# Patient Record
Sex: MALE | Race: WHITE | Employment: FULL TIME | URBAN - NONMETROPOLITAN AREA
[De-identification: names, ages, dates, MRNs, and addresses within clinical notes are randomized per-mention and may not be internally consistent; named-entity substitution may affect disease eponyms.]

---

## 2020-12-01 ENCOUNTER — APPOINTMENT (OUTPATIENT)
Dept: GENERAL RADIOLOGY | Age: 25
End: 2020-12-01
Payer: COMMERCIAL

## 2020-12-01 ENCOUNTER — HOSPITAL ENCOUNTER (EMERGENCY)
Age: 25
Discharge: HOME | End: 2020-12-01
Payer: COMMERCIAL

## 2020-12-01 VITALS
OXYGEN SATURATION: 98 % | SYSTOLIC BLOOD PRESSURE: 130 MMHG | WEIGHT: 245 LBS | RESPIRATION RATE: 16 BRPM | HEIGHT: 72 IN | BODY MASS INDEX: 33.18 KG/M2 | HEART RATE: 83 BPM | DIASTOLIC BLOOD PRESSURE: 87 MMHG | TEMPERATURE: 98.4 F

## 2020-12-01 PROCEDURE — 99203 OFFICE O/P NEW LOW 30 MIN: CPT | Performed by: NURSE PRACTITIONER

## 2020-12-01 PROCEDURE — 73130 X-RAY EXAM OF HAND: CPT

## 2020-12-01 PROCEDURE — 99202 OFFICE O/P NEW SF 15 MIN: CPT

## 2020-12-01 PROCEDURE — 29125 APPL SHORT ARM SPLINT STATIC: CPT

## 2020-12-01 RX ORDER — CLINDAMYCIN HYDROCHLORIDE 150 MG/1
150 CAPSULE ORAL 3 TIMES DAILY
COMMUNITY

## 2020-12-01 RX ORDER — IBUPROFEN 800 MG/1
800 TABLET ORAL EVERY 6 HOURS PRN
COMMUNITY

## 2020-12-01 RX ORDER — METHYLPREDNISOLONE 4 MG/1
2 TABLET ORAL DAILY
COMMUNITY

## 2020-12-01 RX ORDER — HYDROCODONE BITARTRATE AND ACETAMINOPHEN 5; 325 MG/1; MG/1
1 TABLET ORAL EVERY 6 HOURS PRN
Qty: 8 TABLET | Refills: 0 | Status: SHIPPED | OUTPATIENT
Start: 2020-12-01 | End: 2020-12-03

## 2020-12-01 ASSESSMENT — PAIN DESCRIPTION - LOCATION: LOCATION: HAND

## 2020-12-01 ASSESSMENT — ENCOUNTER SYMPTOMS: COLOR CHANGE: 1

## 2020-12-01 ASSESSMENT — PAIN DESCRIPTION - DESCRIPTORS: DESCRIPTORS: ACHING

## 2020-12-01 ASSESSMENT — PAIN DESCRIPTION - FREQUENCY: FREQUENCY: CONTINUOUS

## 2020-12-01 ASSESSMENT — PAIN DESCRIPTION - PAIN TYPE: TYPE: ACUTE PAIN

## 2020-12-01 ASSESSMENT — PAIN SCALES - GENERAL: PAINLEVEL_OUTOF10: 7

## 2020-12-01 ASSESSMENT — PAIN DESCRIPTION - ORIENTATION: ORIENTATION: RIGHT

## 2020-12-01 NOTE — ED TRIAGE NOTES
Pt ambulatory into [facility] with c/o right hand pain and swelling. pt states he was trying to hit a pig but swung and hit a metal gate this am at 0600. Pt states pain 7.

## 2020-12-01 NOTE — ED PROVIDER NOTES
[address] [facility] Encounter      CHIEFCOMPLAINT       Chief Complaint   Patient presents with    Hand Injury     right hand       Nurses Notes reviewed and I agree except as noted in the HPI. HISTORY OF PRESENT ILLNESS   Sony Ramos is a 22 y.o. male who presents to the urgent care for evaluation of right hand injury. He went to swing at a pig around 6 am and missed and his right hand hit the gate. He currently taking ibuprofen and on clindamycin for his wisdom tooth. He has not tried any ice or other treatments. No previous injury to right hand. There is pain, swelling, bruising to right dorsal hand below fifth digit. Denies any numbness, tingling, weakness. REVIEW OF SYSTEMS     Review of Systems   Constitutional: Negative for activity change, appetite change, chills, diaphoresis, fatigue and fever. Musculoskeletal: Positive for arthralgias and joint swelling. Skin: Positive for color change. Negative for pallor, rash and wound. Neurological: Negative for weakness and numbness. Hematological: Does not bruise/bleed easily. PAST MEDICAL HISTORY   History reviewed. No pertinent past medical history. SURGICAL HISTORY     Patient  has a past surgical history that includes [place] tooth extraction; back surgery; and Abdomen surgery. CURRENT MEDICATIONS       Previous Medications    CLINDAMYCIN (CLEOCIN) 150 MG CAPSULE    Take 150 mg by mouth 3 times daily    IBUPROFEN (ADVIL;MOTRIN) 800 MG TABLET    Take 800 mg by mouth every 6 hours as needed for Pain    METHYLPREDNISOLONE (MEDROL) 4 MG TABLET    Take 2 mg by mouth daily       ALLERGIES     Patient is has No Known Allergies. FAMILY HISTORY     Patient'sfamily history is not on file. SOCIAL HISTORY     Patient  reports that he has never smoked. He has never used smokeless tobacco. He reports previous alcohol use. He reports that he does not use drugs.     PHYSICAL EXAM     ED TRIAGE VITALS  BP: 130/87, Temp: 98.4 °F (36.9 °C), Pulse: 83, Resp: 16, SpO2: 98 %  Physical Exam  Vitals signs and nursing note reviewed. Constitutional:       General: He is not in acute distress. Appearance: Normal appearance. He is well-developed and well-groomed. He is obese. He is not ill-appearing, toxic-appearing or diaphoretic. HENT:      Head: Normocephalic and atraumatic. Cardiovascular:      Rate and Rhythm: Normal rate and regular rhythm. Pulses: Normal pulses. Radial pulses are 2+ on the right side. Pulmonary:      Effort: Pulmonary effort is normal.      Breath sounds: Normal breath sounds and air entry. No stridor, decreased air movement or transmitted upper airway sounds. Musculoskeletal:         General: Swelling, tenderness, deformity and signs of injury present. Right wrist: Normal.      Right hand: He exhibits decreased range of motion, tenderness, bony tenderness, deformity and swelling. He exhibits normal capillary refill and no laceration. Normal sensation noted. Decreased sensation is not present in the ulnar distribution, is not present in the medial distribution and is not present in the radial distribution. Decreased strength noted. Hands:    Skin:     General: Skin is warm and dry. Capillary Refill: Capillary refill takes less than 2 seconds. Coloration: Skin is not pale. Findings: Bruising present. No erythema, lesion or rash. Neurological:      Mental Status: He is alert. Psychiatric:         Behavior: Behavior is cooperative. DIAGNOSTIC RESULTS   Labs:  Abnormal Labs Reviewed - No data to display     IMAGING:  XR HAND RIGHT (MIN 3 VIEWS)   Final Result   1. There is an angulated mildly comminuted fracture through the midshaft of the right fifth metacarpal bone with overlying soft tissue swelling. This is compatible with a boxer's fracture. **This report has been created using voice recognition software.   It may contain minor errors which are inherent in voice recognition technology. **      Final report electronically signed by Dr. Clive Gonzalez on 12/1/2020 4:35 PM        URGENT CARE COURSE:     Vitals:    12/01/20 1613   BP: 130/87   Pulse: 83   Resp: 16   Temp: 98.4 °F (36.9 °C)   TempSrc: Temporal   SpO2: 98%   Weight: 245 lb (111.1 kg)   Height: 6' (1.829 m)       Medications - No data to display  PROCEDURES:  Right hand splint: ulnar gutter placed  There was no neurovascular compromise after splint application; the splint was in good alignment and the patient had good sensation and capillary refill at the time of discharge. FINALIMPRESSION      1. Closed boxer's fracture, initial encounter        DISPOSITION/PLAN   DISPOSITION    Paged orthopedics on call at [phone] through [employer] regarding right hand xray. Waiting for call back. 1645- ortho on call called back, agreeable with treatment plan        Xray of right hand consistent with a boxers fracture. Ulnar gutter splint in place  No neurovascular compromise    X-ray of the right hand was obtained there is an angulated mildly comminuted fracture through the midshaft of the right fifth metacarpal bone with soft tissue swelling. Controlled Substance Monitoring:    Acute and Chronic Pain Monitoring:   RX Monitoring 12/1/2020   Periodic Controlled Substance Monitoring Possible medication side effects, risk of tolerance/dependence & alternative treatments discussed. ;No signs of potential drug abuse or diversion identified. Physical assessment findings, diagnostic testing(s) if applicable, and vital signs reviewed with patient/patient representative. Questions answered. If applicable, patient/patient representative will be contacted upon receipt of final culture and sensitivity or other testing results when available. Any additions or changes to medications or changes the plan of care will be made at that time.   Medications as directed, including OTC medications for supportive care. Education provided on medications. Differential diagnosis(s) discussed with patient/patient representative. Home care/self care instructions reviewed with patient/patient representative. Patient is to follow-up with family care provider in 2-3 days if no improvement. Patient is to go to the emergency department if symptoms worsen. Patient/patient representative is aware of care plan, questions answered, verbalizes understanding and is in agreement. Teach back method used for patient/patient representative teaching(s) and printed instructions attached to after visit summary.       ED Course as of Dec 01 1656   Tue Dec 01, 2020   1643 XR HAND RIGHT (MIN 3 VIEWS) [AC]      ED Course User Index  [AC] SILVANA Gilman CNP       Problem List Items Addressed This Visit     None      Visit Diagnoses     Closed boxer's fracture, initial encounter    -  Primary    Right hand    Relevant Medications    ibuprofen (ADVIL;MOTRIN) 800 MG tablet    HYDROcodone-acetaminophen (Michelle Lars) 5-325 MG per tablet          PATIENT REFERRED TO:  Neena Brown [address]  [facility] [address] [facility] [phone]  Go in 1 day  For appointment       SILVANA Gilman CNP, APRN - CNP  12/01/20 0679